# Patient Record
Sex: FEMALE | NOT HISPANIC OR LATINO | URBAN - METROPOLITAN AREA
[De-identification: names, ages, dates, MRNs, and addresses within clinical notes are randomized per-mention and may not be internally consistent; named-entity substitution may affect disease eponyms.]

---

## 2017-06-13 ENCOUNTER — RECORDS - HEALTHEAST (OUTPATIENT)
Dept: LAB | Facility: CLINIC | Age: 62
End: 2017-06-13

## 2017-06-13 LAB
CHOLEST SERPL-MCNC: 190 MG/DL
FASTING STATUS PATIENT QL REPORTED: ABNORMAL
HDLC SERPL-MCNC: 43 MG/DL
LDLC SERPL CALC-MCNC: 85 MG/DL
TRIGL SERPL-MCNC: 310 MG/DL

## 2017-07-31 ENCOUNTER — COMMUNICATION - HEALTHEAST (OUTPATIENT)
Dept: NEUROSURGERY | Facility: CLINIC | Age: 62
End: 2017-07-31

## 2017-07-31 DIAGNOSIS — I60.9 SAH (SUBARACHNOID HEMORRHAGE) (H): ICD-10-CM

## 2017-08-11 ENCOUNTER — OFFICE VISIT - HEALTHEAST (OUTPATIENT)
Dept: NEUROSURGERY | Facility: CLINIC | Age: 62
End: 2017-08-11

## 2017-08-11 ENCOUNTER — HOSPITAL ENCOUNTER (OUTPATIENT)
Dept: CT IMAGING | Facility: CLINIC | Age: 62
Discharge: HOME | End: 2017-08-11
Attending: NEUROLOGICAL SURGERY

## 2017-08-11 DIAGNOSIS — I60.9 SAH (SUBARACHNOID HEMORRHAGE) (H): ICD-10-CM

## 2017-08-11 ASSESSMENT — MIFFLIN-ST. JEOR: SCORE: 914.23

## 2018-02-14 ENCOUNTER — RECORDS - HEALTHEAST (OUTPATIENT)
Dept: ADMINISTRATIVE | Facility: OTHER | Age: 63
End: 2018-02-14

## 2018-02-15 ENCOUNTER — RECORDS - HEALTHEAST (OUTPATIENT)
Dept: ADMINISTRATIVE | Facility: OTHER | Age: 63
End: 2018-02-15

## 2018-04-18 ENCOUNTER — RECORDS - HEALTHEAST (OUTPATIENT)
Dept: ADMINISTRATIVE | Facility: OTHER | Age: 63
End: 2018-04-18

## 2018-04-19 ENCOUNTER — RECORDS - HEALTHEAST (OUTPATIENT)
Dept: ADMINISTRATIVE | Facility: OTHER | Age: 63
End: 2018-04-19

## 2018-04-21 ENCOUNTER — AMBULATORY - HEALTHEAST (OUTPATIENT)
Dept: MEDSURG UNIT | Facility: CLINIC | Age: 63
End: 2018-04-21

## 2018-04-23 ENCOUNTER — RECORDS - HEALTHEAST (OUTPATIENT)
Dept: ADMINISTRATIVE | Facility: OTHER | Age: 63
End: 2018-04-23

## 2018-04-24 ENCOUNTER — RECORDS - HEALTHEAST (OUTPATIENT)
Dept: ADMINISTRATIVE | Facility: OTHER | Age: 63
End: 2018-04-24

## 2018-04-30 ENCOUNTER — RECORDS - HEALTHEAST (OUTPATIENT)
Dept: ADMINISTRATIVE | Facility: OTHER | Age: 63
End: 2018-04-30

## 2018-05-01 ENCOUNTER — AMBULATORY - HEALTHEAST (OUTPATIENT)
Dept: VASCULAR SURGERY | Facility: CLINIC | Age: 63
End: 2018-05-01

## 2018-05-01 DIAGNOSIS — I71.20 THORACIC AORTIC ANEURYSM WITHOUT RUPTURE (H): ICD-10-CM

## 2018-05-16 ENCOUNTER — OFFICE VISIT - HEALTHEAST (OUTPATIENT)
Dept: VASCULAR SURGERY | Facility: CLINIC | Age: 63
End: 2018-05-16

## 2018-05-16 DIAGNOSIS — I71.20 THORACIC AORTIC ANEURYSM WITHOUT RUPTURE (H): ICD-10-CM

## 2018-05-16 RX ORDER — ATORVASTATIN CALCIUM 40 MG/1
40 TABLET, FILM COATED ORAL AT BEDTIME
Qty: 30 TABLET | Refills: 5 | Status: SHIPPED | OUTPATIENT
Start: 2018-05-16

## 2018-08-31 ENCOUNTER — RECORDS - HEALTHEAST (OUTPATIENT)
Dept: LAB | Facility: CLINIC | Age: 63
End: 2018-08-31

## 2018-08-31 LAB
ANION GAP SERPL CALCULATED.3IONS-SCNC: 9 MMOL/L (ref 5–18)
BUN SERPL-MCNC: 54 MG/DL (ref 8–22)
CALCIUM SERPL-MCNC: 10.3 MG/DL (ref 8.5–10.5)
CHLORIDE BLD-SCNC: 102 MMOL/L (ref 98–107)
CHOLEST SERPL-MCNC: 160 MG/DL
CO2 SERPL-SCNC: 29 MMOL/L (ref 22–31)
CREAT SERPL-MCNC: 1.31 MG/DL (ref 0.6–1.1)
FASTING STATUS PATIENT QL REPORTED: NORMAL
GFR SERPL CREATININE-BSD FRML MDRD: 41 ML/MIN/1.73M2
GLUCOSE BLD-MCNC: 110 MG/DL (ref 70–125)
HDLC SERPL-MCNC: 57 MG/DL
LDLC SERPL CALC-MCNC: 79 MG/DL
POTASSIUM BLD-SCNC: 4.4 MMOL/L (ref 3.5–5)
SODIUM SERPL-SCNC: 140 MMOL/L (ref 136–145)
TRIGL SERPL-MCNC: 120 MG/DL

## 2018-10-03 ENCOUNTER — RECORDS - HEALTHEAST (OUTPATIENT)
Dept: LAB | Facility: CLINIC | Age: 63
End: 2018-10-03

## 2018-10-03 LAB
ANION GAP SERPL CALCULATED.3IONS-SCNC: 12 MMOL/L (ref 5–18)
BUN SERPL-MCNC: 23 MG/DL (ref 8–22)
CALCIUM SERPL-MCNC: 9.7 MG/DL (ref 8.5–10.5)
CHLORIDE BLD-SCNC: 106 MMOL/L (ref 98–107)
CO2 SERPL-SCNC: 25 MMOL/L (ref 22–31)
CREAT SERPL-MCNC: 0.69 MG/DL (ref 0.6–1.1)
GFR SERPL CREATININE-BSD FRML MDRD: >60 ML/MIN/1.73M2
GLUCOSE BLD-MCNC: 72 MG/DL (ref 70–125)
POTASSIUM BLD-SCNC: 4.2 MMOL/L (ref 3.5–5)
SODIUM SERPL-SCNC: 143 MMOL/L (ref 136–145)

## 2019-03-27 ENCOUNTER — RECORDS - HEALTHEAST (OUTPATIENT)
Dept: LAB | Facility: CLINIC | Age: 64
End: 2019-03-27

## 2019-03-27 LAB
ANION GAP SERPL CALCULATED.3IONS-SCNC: 12 MMOL/L (ref 5–18)
BUN SERPL-MCNC: 19 MG/DL (ref 8–22)
CALCIUM SERPL-MCNC: 9.7 MG/DL (ref 8.5–10.5)
CHLORIDE BLD-SCNC: 101 MMOL/L (ref 98–107)
CHOLEST SERPL-MCNC: 192 MG/DL
CO2 SERPL-SCNC: 26 MMOL/L (ref 22–31)
CREAT SERPL-MCNC: 0.75 MG/DL (ref 0.6–1.1)
FASTING STATUS PATIENT QL REPORTED: NO
FASTING STATUS PATIENT QL REPORTED: NO
GFR SERPL CREATININE-BSD FRML MDRD: >60 ML/MIN/1.73M2
GLUCOSE BLD-MCNC: 93 MG/DL (ref 70–125)
GLUCOSE BLD-MCNC: 93 MG/DL (ref 70–125)
HDLC SERPL-MCNC: 61 MG/DL
LDLC SERPL CALC-MCNC: 100 MG/DL
POTASSIUM BLD-SCNC: 4 MMOL/L (ref 3.5–5)
PROLACTIN SERPL-MCNC: 53.7 NG/ML (ref 0–20)
SODIUM SERPL-SCNC: 139 MMOL/L (ref 136–145)
TRIGL SERPL-MCNC: 155 MG/DL

## 2019-03-28 LAB — HBA1C MFR BLD: 6.1 % (ref 4.2–6.1)

## 2020-01-02 ENCOUNTER — RECORDS - HEALTHEAST (OUTPATIENT)
Dept: LAB | Facility: CLINIC | Age: 65
End: 2020-01-02

## 2020-01-02 LAB
ALBUMIN SERPL-MCNC: 3.8 G/DL (ref 3.5–5)
ALP SERPL-CCNC: 79 U/L (ref 45–120)
ALT SERPL W P-5'-P-CCNC: 19 U/L (ref 0–45)
ANION GAP SERPL CALCULATED.3IONS-SCNC: 9 MMOL/L (ref 5–18)
AST SERPL W P-5'-P-CCNC: 25 U/L (ref 0–40)
BILIRUB SERPL-MCNC: 0.3 MG/DL (ref 0–1)
BUN SERPL-MCNC: 19 MG/DL (ref 8–22)
CALCIUM SERPL-MCNC: 10 MG/DL (ref 8.5–10.5)
CHLORIDE BLD-SCNC: 102 MMOL/L (ref 98–107)
CO2 SERPL-SCNC: 29 MMOL/L (ref 22–31)
CREAT SERPL-MCNC: 0.75 MG/DL (ref 0.6–1.1)
GFR SERPL CREATININE-BSD FRML MDRD: >60 ML/MIN/1.73M2
GLUCOSE BLD-MCNC: 112 MG/DL (ref 70–125)
LIPASE SERPL-CCNC: 31 U/L (ref 0–52)
POTASSIUM BLD-SCNC: 3.7 MMOL/L (ref 3.5–5)
PROT SERPL-MCNC: 7.2 G/DL (ref 6–8)
SODIUM SERPL-SCNC: 140 MMOL/L (ref 136–145)

## 2021-02-01 ENCOUNTER — RECORDS - HEALTHEAST (OUTPATIENT)
Dept: LAB | Facility: CLINIC | Age: 66
End: 2021-02-01

## 2021-02-01 LAB
ANION GAP SERPL CALCULATED.3IONS-SCNC: 8 MMOL/L (ref 5–18)
BUN SERPL-MCNC: 20 MG/DL (ref 8–22)
CALCIUM SERPL-MCNC: 9 MG/DL (ref 8.5–10.5)
CHLORIDE BLD-SCNC: 103 MMOL/L (ref 98–107)
CHOLEST SERPL-MCNC: 192 MG/DL
CO2 SERPL-SCNC: 30 MMOL/L (ref 22–31)
CREAT SERPL-MCNC: 0.72 MG/DL (ref 0.6–1.1)
FASTING STATUS PATIENT QL REPORTED: NO
GFR SERPL CREATININE-BSD FRML MDRD: >60 ML/MIN/1.73M2
GLUCOSE BLD-MCNC: 144 MG/DL (ref 70–125)
HDLC SERPL-MCNC: 56 MG/DL
LDLC SERPL CALC-MCNC: 104 MG/DL
MAGNESIUM SERPL-MCNC: 1.7 MG/DL (ref 1.8–2.6)
POTASSIUM BLD-SCNC: 3.7 MMOL/L (ref 3.5–5)
SODIUM SERPL-SCNC: 141 MMOL/L (ref 136–145)
TRIGL SERPL-MCNC: 159 MG/DL
VIT B12 SERPL-MCNC: 626 PG/ML (ref 213–816)

## 2021-05-28 ENCOUNTER — RECORDS - HEALTHEAST (OUTPATIENT)
Dept: ADMINISTRATIVE | Facility: CLINIC | Age: 66
End: 2021-05-28

## 2021-05-31 VITALS — BODY MASS INDEX: 17.56 KG/M2 | WEIGHT: 93 LBS | HEIGHT: 61 IN

## 2021-06-12 NOTE — PROGRESS NOTES
Sandy is here for hospital f/u from 7/29/17. Pt was seen for small right frontal SAH. She states she still has HA and some balance issues.   Rose,CMA

## 2021-06-12 NOTE — PROGRESS NOTES
Sandy is here for hospital f/u from 7/29/17. Pt was seen for small right frontal SAH. She states she still has HA and some balance issues.     Her CT scan shows excellent resolution of the subarachnoid hemorrhage.  I did advise her that she will continue to have some headaches related to the hemorrhage.  She has always had headaches.  She should use a simple anti-inflammatory and the headaches will improve over time.  She can see me on a as needed basis.    Massiel Calhoun MD, FACS, FAANS

## 2021-06-15 PROBLEM — R07.9 CHEST PAIN: Status: ACTIVE | Noted: 2017-05-06

## 2021-06-16 PROBLEM — R09.02 HYPOXIA: Status: ACTIVE | Noted: 2018-04-18

## 2021-06-16 PROBLEM — R51.9 ACUTE HEADACHE: Status: ACTIVE | Noted: 2017-07-29

## 2021-06-16 PROBLEM — I60.9 SUBARACHNOID HEMORRHAGE (H): Status: ACTIVE | Noted: 2017-07-29

## 2021-06-16 PROBLEM — R11.2 NAUSEA & VOMITING: Status: ACTIVE | Noted: 2017-07-29

## 2021-06-16 PROBLEM — I60.2: Status: ACTIVE | Noted: 2017-07-29

## 2021-06-17 NOTE — PROGRESS NOTES
Received intake call for home oxygen at 1:31pm Reviewed patient's chart; Patient qualifies under Medicare guidelines and all documentation is in the chart except a signed order.   1:40pm- Called respiratory therapist, Avis, confirmed I received the order and we had everything we needed, except the order was signed yet. She said she would page the Dr to let them know, as she had just entered it. I told her I would call the patient in the meantime to offer choice and explain the process, as it will be a home set up, so we will bring transport to the hospital for her to discharge. I told her I cannot deliver O2 to bedside until the order is signed, so that will depend on when we get there. She understood.   2:15pm-  Called to offer choice and patient is okay with [facility] setting them up. Discussed equipment with patient and she understood the process. She did say the doctors had not yet confirmed she was going home as they were waiting on a chest CT first. I told her we would deliver the tank to bedside today no matter what and wait for confirmation from the hospital staff about discharge. She understood.   2:20pm- Called Avis back and asked if discharge was for sure or not. She said she was under the impression it was, based on patient request. She said she will let me know for sure, but we will continue to proceed as if the patient is going home today.   3:23pm- Received another page from Avis letting us know the patient also needs a neb machine. Called right back and she was not available, but another RT took my call and said the orders should be signed, and I asked about the O2 order, as it was not signed at 3:15pm. She said she would double check on that.   3:45pm- Received page from Avis letting me know the neb order was signed. I called her back and she confirmed the neb and o2 order were signed. I explained I was finishing up another set up and headed back to the office to check, but  our staff will head out shortly to deliver o2 to bedside. She did not need anything else at that time.   4:05pm- Got back into the office and could not find a neb order, called Avis and she was able to see the order, but not able to print it our for me, She checked in with the nurse and after a little while it was discovered the neb order was sent to the pharmacy along with the medications. She informed me that we no longer needed to supply the patient with a nebulizer. I asked if they were sure they had nebs available at the pharmacy and she said yes. I explained my staff will keep the neb on them in case, but otherwise we will head over there and be to bedside with O2 in the next hour. She understood.   4:10pm- Sent [facility] staff to hospital with O2, she is aware of the nebulizer situation.

## 2021-06-18 NOTE — PROGRESS NOTES
VASCULAR SURGERY CLINIC CONSULTATION    VASCULAR SURGEON: Mariela Benitez MD    LOCATION:  [facility]    Sandy Mcwilliams   Medical Record #:  [id number]  YOB: 1955  Age:  62 y.o.     Date of Service: 5/16/2018    PRIMARY CARE PROVIDER: Vita Burnett MD      Reason for visit: Consultation from Dr. Lopez regarding known thoracic aortic aneurysm    IMPRESSION: 5 cm thoracic aneurysm involving the entire descending thoracic aorta and with a focal segment of stable dissection.  Would not recommend repair until over 6 cm in size.  Risk of rupture is not 0 but lower than the risk from repair.  Repair will be complicated as it will likely involve at least celiac artery bypass in addition to stent grafting the thoracic aorta.  The repair would also have at least a 10% risk of paraplegia given that she has had a previous open AAA repair.  Patient has COPD and active smoking and so open thoracic aortic repair probably not a good option although formal PFTs would need to be done.  Patient is trying to quit smoking.  Has not been taking her statin.    RECOMMENDATION/RISKS: Guarded long-term prognosis given severity of aneurysmal disease, active smoking, and COPD.  Will try to optimize her medical profile by quitting smoking and starting on high-dose statin.  Return visit in 6 months time with CTA chest abdomen pelvis.    HPI:  Sandy Mcwilliams is a 62 y.o. female who was seen today in consultation for thoracic aortic aneurysm.  She had discovered this disease in 2012 after presentation with ruptured abdominal aortic aneurysm associated with syncope and back pain.  The rupture actually happened in the emergency room and she was immediately operated on with open AAA repair by Dr. Gonzalez successfully.  Postoperative course was complicated by stroke from which she has fully recovered with minimal limitation in her right arm function.  Etiology of stroke was not clear but her carotids are without  important disease.  She is an ongoing smoker.  She has no family history of aortic aneurysms.  Her father is alive with dementia and her mother passed away from lung cancer.  She has no siblings.  She has 4 children who have not been tested for aortic aneurysm.    Her thoracic aortic aneurysm has been followed with serial imaging.  Not had a discussion about what repair would entail.  She had been prescribed statin but has not been taking it because she did not know what benefit it might have.  We will she understands importance of smoking cessation and is willing to try.    No other active medical complaints.    REVIEW OF SYSTEMS:    A 12 point ROS was reviewed and except for what is listed in the HPI above, all others are negative    PHH:    Past Medical History:   Diagnosis Date     Aortic aneurysm rupture      Aortic dissection      Cerebral vascular accident      COPD (chronic obstructive pulmonary disease)      Hemiparesis affecting right side as late effect of cerebrovascular accident (CVA) 03/28/2016     Hypertension      Occlusion of right carotid artery 03/28/2016    Less than 50% on the left     Ruptured abdominal aortic aneurysm 03/28/2016    Emergent repair Dr. ERAN Gonzalez     Stroke 03/28/2016    [place] type bilaterally associated with emergent AAA repair     Thoracic aortic aneurysm 03/28/2016    4.2 cm when found on imaging        Past Surgical History:   Procedure Laterality Date     ABDOMINAL AORTIC ANEURYSM REPAIR, OPEN N/A 3/28/2016    Procedure: ABDOMINAL AORTIC ANEURYSM REPAIR WITH CELL SAVER;  Surgeon: Kyle Gonzalez MD;  Location: [facility];  Service:      LEG SURGERY      fracture repair       ALLERGIES:  Codeine    MEDS:    Current Outpatient Prescriptions:      albuterol (PROVENTIL) 2.5 mg /3 mL (0.083 %) nebulizer solution, EVERY 4 HOURS FOR 1 WEEK THEN EVERY 4 HOURS AS NEEDED, Disp: 40 vial, Rfl: 0     aspirin 81 MG EC tablet, Take 1 tablet (81 mg total) by mouth daily., Disp: ,  Rfl: 0     budesonide-formoterol (SYMBICORT) 160-4.5 mcg/actuation inhaler, Inhale 2 puffs 2 (two) times a day., Disp: 1 Inhaler, Rfl: 12     gabapentin (NEURONTIN) 100 MG capsule, Take 100 mg by mouth 3 (three) times a day., Disp: , Rfl:      lidocaine (LIDODERM) 5 %, Remove & Discard patch within 12 hours or as directed by MD, Disp: 30 patch, Rfl: 0     lisinopril (PRINIVIL,ZESTRIL) 20 MG tablet, Take 20 mg by mouth 2 (two) times a day., Disp: , Rfl:      metoprolol tartrate (LOPRESSOR) 50 MG tablet, Take 1 tablet (50 mg total) by mouth 2 (two) times a day., Disp: 60 tablet, Rfl: 0     multivitamin with minerals (THERA-M) 9 mg iron-400 mcg Tab tablet, Take 1 tablet by mouth daily., Disp: , Rfl:      nebulizer and compressor Daniela, Qid for 1 week and then as needed, Disp: 1 each, Rfl: 0     ondansetron (ZOFRAN) 4 MG tablet, Take 1 tablet (4 mg total) by mouth every 6 (six) hours., Disp: 12 tablet, Rfl: 0     oxyCODONE (ROXICODONE) 5 MG immediate release tablet, Take 1 tablet (5 mg total) by mouth every 6 (six) hours as needed for pain., Disp: 15 tablet, Rfl: 0     risperiDONE (RISPERDAL) 1 MG tablet, Take 1 mg by mouth at bedtime., Disp: , Rfl:      sertraline (ZOLOFT) 100 MG tablet, Take 100 mg by mouth 2 (two) times a day., Disp: , Rfl:      tiotropium (SPIRIVA) 18 mcg inhalation capsule, Place 18 mcg into inhaler and inhale daily., Disp: , Rfl:      traZODone (DESYREL) 100 MG tablet, Take 100 mg by mouth 2 (two) times a day., Disp: , Rfl:   No current facility-administered medications for this visit.     Facility-Administered Medications Ordered in Other Visits:      [COMPLETED] albumin human 5 % bottle 12.5 g, 12.5 g, Intravenous, Once, Chloe Castaneda DO, 12.5 g at 04/02/16 0911    SOCIAL HABITS:    History   Smoking Status     Former Smoker     Types: Cigarettes     Quit date: 04/2018   Smokeless Tobacco     Not on file     Comment: Quit smoking after hospital admission        History   Alcohol Use No        History   Drug Use No       FAMILY HISTORY:    Family History   Problem Relation Age of Onset     Cancer Mother      Stroke Father            PE:  /80  Pulse 70  Temp 98.4  F (36.9  C)  Resp 14  LMP  (LMP Unknown)  Wt Readings from Last 1 Encounters:   04/24/18 100 lb (45.4 kg)     There is no height or weight on file to calculate BMI.    EXAM:  GENERAL: This is a well-developed 62 y.o. female who appears her stated age  EYES: Grossly normal.  MOUTH: Buccal mucosa normal   MUSCULOSKELETAL: Grossly normal and both lower extremities are intact.  HEME/LYMPH: No lymphedema  NEUROLOGIC: Focally intact, Alert and oriented x 3.   PSYCH: appropriate affect  INTEGUMENT: No open lesions or ulcers      DIAGNOSTIC STUDIES:     Images:  Cta Chest Pe Run    Result Date: 4/21/2018  CTA CHEST PE RUN 4/21/2018 3:59 PM INDICATION: hypoxia r/o pe TECHNIQUE: Helical acquisition through the chest was performed during the arterial phase of contrast enhancement using IV contrast. 2D and 3D reconstructions were performed by the CT technologist. Dose reduction techniques were used. IV CONTRAST: Iohexol (Omni) 100 mL COMPARISON: 03/28/2016, that showed a type B aortic dissection in the ascending thoracic aorta FINDINGS: ANGIOGRAM CHEST: No pulmonary embolism is seen. The ascending thoracic aorta measures 2.6 cm in diameter but the aorta at the top of the arch is dilated and there is a type B dissection that is similar in appearance to the study from 03/28/2016. Flow in the false channel is less prominent on today's study. The lower the descending thoracic aorta is aneurysmal, the transverse diameter on image 220 is 4.6 cm diameter, on the prior study at approximately same level I measure it at 4.4. At the level of the diaphragmatic hiatus there is mural thrombus in the aorta and it measures 5 cm in diameter, on the prior study I measure it at 4.7 at this level. RV/LV RATIO: N/A LUNGS AND PLEURA: There is significant emphysema.  There also is bronchial wall thickening in both lungs. MEDIASTINUM: Type B aortic dissection is described above. No adenopathy is seen. LIMITED UPPER ABDOMEN: On the prior study there was evidence for a ruptured aortic aneurysm in the abdomen. Hemorrhage seen in the prior study is no longer visible. MUSCULOSKELETAL: Negative.     CONCLUSION: 1.  A pulmonary embolism is not seen. 2.  The thoracic aorta is dilated from the top of the arch down to the diaphragmatic hiatus. There is a type B aortic dissection starting at the top of the arch extending down the descending thoracic aorta that is similar in appearance to the study of 03/28/2016. The descending thoracic aorta however is more dilated than on the prior study, today its maximum transverse diameter is 4.6 cm in. On the prior study it was 4.4. 3.  There is significant emphysema. 4.  Coronary artery calcifications.    Ct Abdomen Pelvis Without Oral With Iv Contrast    Result Date: 4/24/2018  CT ABDOMEN PELVIS WO ORAL W IV CONTRAST 4/24/2018 10:45 AM     INDICATION: Abdominal pain LLQ abdominal pain TECHNIQUE: CT abdomen and pelvis. Multiplanar reformation images (MPR). Dose reduction techniques were used. IV CONTRAST: Iohexol (Omni) 100 mL COMPARISON: 7/29/2017 and 5/6/2017 FINDINGS: LUNG BASES: Band of discoid atelectasis right middle lobe. Prominent dilatation of ascending thoracic aorta with short axis dimension of 5 cm, unchanged. ABDOMEN: Liver, gallbladder, bile ducts, pancreas, spleen and kidneys appear unremarkable. Aorta returns to normal caliber in the upper abdomen but is tortuous. Maximal dimension within mid abdomen approximately 3 cm and within distal abdomen abdomen approximately 2.5 cm. PELVIS: Bowel unremarkable, nothing inflammatory or obstructive. No adnexal lesions or free fluid. Dense atherosclerotic calcifications of iliac arteries but no pelvic aneurysm. MUSCULOSKELETAL: Degenerative changes at L2-3. No suspicious bony lesions.      CONCLUSION: 1.  No etiology for symptoms evident. Nothing inflammatory or obstructive. 2.  No change in the prominent atherosclerotic changes of aorta including a large aneurysm involving lower thoracic aorta to level of the diaphragmatic hiatus.      I personally reviewed the images and my interpretation is that her CT shows a descending thoracic aneurysm that starts from the left subclavian all the way to the celiac artery.  There is a focal area of dissection within it.  This was seen in 2016 as well.  At no point his aneurysm bigger than 4.9 cm in maximum diameter.  The prior AAA repair is also visible is infrarenal.    LABS:      Sodium   Date Value Ref Range Status   04/24/2018 143 136 - 145 mmol/L Final   04/21/2018 141 136 - 145 mmol/L Final   04/19/2018 138 136 - 145 mmol/L Final     Potassium   Date Value Ref Range Status   04/24/2018 2.9 (L) 3.5 - 5.0 mmol/L Final   04/21/2018 4.0 3.5 - 5.0 mmol/L Final   04/19/2018 4.5 3.5 - 5.0 mmol/L Final     Chloride   Date Value Ref Range Status   04/24/2018 98 98 - 107 mmol/L Final   04/21/2018 102 98 - 107 mmol/L Final   04/19/2018 104 98 - 107 mmol/L Final     BUN   Date Value Ref Range Status   04/24/2018 19 8 - 22 mg/dL Final   04/21/2018 13 8 - 22 mg/dL Final   04/19/2018 16 8 - 22 mg/dL Final     Creatinine   Date Value Ref Range Status   04/24/2018 0.69 0.60 - 1.10 mg/dL Final   04/21/2018 0.63 0.60 - 1.10 mg/dL Final   04/19/2018 0.67 0.60 - 1.10 mg/dL Final     Hemoglobin   Date Value Ref Range Status   04/24/2018 13.6 12.0 - 16.0 g/dL Final   04/19/2018 11.9 (L) 12.0 - 16.0 g/dL Final   04/18/2018 12.4 12.0 - 16.0 g/dL Final     Platelets   Date Value Ref Range Status   04/24/2018 191 140 - 440 thou/uL Final   04/20/2018 179 140 - 440 thou/uL Final   04/19/2018 161 140 - 440 thou/uL Final     BNP   Date Value Ref Range Status   04/21/2018 302 (H) 0 - 93 pg/mL Final   04/18/2018 16 0 - 93 pg/mL Final     INR   Date Value Ref Range Status   07/29/2017  1.10 0.90 - 1.10 Final   05/06/2017 0.98 0.90 - 1.10 Final   03/03/2017 0.98 0.90 - 1.10 Final     Mariela Benitez MD  VASCULAR SURGERY

## 2021-06-18 NOTE — PROGRESS NOTES
Descending thorasic aorta, previous ERNA pt in 2014. Referred by Dr. Burnett. CT on 4/24/18 showed  Prominent dilatation of ascending thoracic aorta with short axis dimension of 5 cm, unchanged. Smoker. HTN. ASA.